# Patient Record
Sex: FEMALE | Race: WHITE | NOT HISPANIC OR LATINO
[De-identification: names, ages, dates, MRNs, and addresses within clinical notes are randomized per-mention and may not be internally consistent; named-entity substitution may affect disease eponyms.]

---

## 2023-03-22 PROBLEM — Z00.00 ENCOUNTER FOR PREVENTIVE HEALTH EXAMINATION: Status: ACTIVE | Noted: 2023-03-22

## 2023-04-13 ENCOUNTER — APPOINTMENT (OUTPATIENT)
Dept: GYNECOLOGIC ONCOLOGY | Facility: CLINIC | Age: 48
End: 2023-04-13
Payer: COMMERCIAL

## 2023-04-13 VITALS
SYSTOLIC BLOOD PRESSURE: 132 MMHG | OXYGEN SATURATION: 96 % | BODY MASS INDEX: 39.27 KG/M2 | HEART RATE: 80 BPM | DIASTOLIC BLOOD PRESSURE: 84 MMHG | HEIGHT: 64 IN | WEIGHT: 230 LBS

## 2023-04-13 DIAGNOSIS — R73.03 PREDIABETES.: ICD-10-CM

## 2023-04-13 DIAGNOSIS — Z80.0 FAMILY HISTORY OF MALIGNANT NEOPLASM OF DIGESTIVE ORGANS: ICD-10-CM

## 2023-04-13 DIAGNOSIS — Z87.42 PERSONAL HISTORY OF OTHER DISEASES OF THE FEMALE GENITAL TRACT: ICD-10-CM

## 2023-04-13 DIAGNOSIS — Z78.9 OTHER SPECIFIED HEALTH STATUS: ICD-10-CM

## 2023-04-13 DIAGNOSIS — Z80.3 FAMILY HISTORY OF MALIGNANT NEOPLASM OF BREAST: ICD-10-CM

## 2023-04-13 PROCEDURE — 99204 OFFICE O/P NEW MOD 45 MIN: CPT

## 2023-04-13 RX ORDER — MEDROXYPROGESTERONE ACETATE 10 MG/1
10 TABLET ORAL
Refills: 0 | Status: ACTIVE | COMMUNITY

## 2023-04-13 RX ORDER — SEMAGLUTIDE 0.68 MG/ML
INJECTION, SOLUTION SUBCUTANEOUS
Refills: 0 | Status: ACTIVE | COMMUNITY

## 2023-04-13 RX ORDER — METFORMIN ER 500 MG 500 MG/1
500 TABLET ORAL
Refills: 0 | Status: ACTIVE | COMMUNITY

## 2023-04-21 NOTE — PHYSICAL EXAM
[Chaperone Present] : A chaperone was present in the examining room during all aspects of the physical examination [Abnormal] : General appearance: Abnormal [Overweight] : overweight [Normal] : Bimanual Exam: Normal [Fully active, able to carry on all pre-disease performance without restriction] : Status 0 - Fully active, able to carry on all pre-disease performance without restriction [FreeTextEntry1] : Patient was interviewed and examined with chaperone present. Name of chaperone: Inna Chowdhury  [de-identified] : deferred [de-identified] : high anterior lying cervix

## 2023-04-21 NOTE — HISTORY OF PRESENT ILLNESS
[FreeTextEntry1] : 48yo , c/s x 1,  x 1, LMP 23 referred by Dr. Arnold for evaluation of endometrial hyperplasia. Patient's menses are irregular with long bouts of amenorrhea managed with Provera therapy to bring on her menses. History of PCOS and prediabetes managed with Ozempic and Metformin. She had EMB in 2022 revealing fragments with focally crowded glands without epithelial atypia, cannot rule out endometrial hyperplasia without atypia.  She was advised to have a D&C which was ultimately done in 2023.  \par \par 23-(John)D&C, hysteroscopy. myosure-endometrial tissue with features of endometrial polyp and mild simple hyperplasia without atypia. ECC with benign endocervical epithelium with atypical squamous metaplasia favor reactive. \par \par Pt was advised to consider a hysterectomy vs repeat sonograms and biopsies. She is done childbearing. \par \par Health maintenance:\par \par Pap [id number] wnl \par Mammo- -rerpots wnl \par Colonoscopy-never  \par \par \par

## 2023-04-21 NOTE — ASSESSMENT
[FreeTextEntry1] : 48yo female with PCOS, irregular menses and simple hyperplasia without atypia. \par \par Discussed with patient treatment options of Provera therapy x 3 months vs IUD vs hysterectomy. I do not feel a hysterectomy is warranted at this time as simple hyperplasias can be managed with medical therapy alone for the most part. I would favor Mirena IUD given her weight issue and increased risk of hyperplasia, however, patient does not like the idea of a foreign body in her uterus. Advised patient that with an IUD, biopsies would also be less frequent. \par \par Pt agreed to proceed with Provera therapy with plan for EMB and pelvic US in 3 months. She will contact me should she change her mind and pursue IUD. \par \par

## 2023-04-21 NOTE — CHIEF COMPLAINT
[FreeTextEntry1] : [facility]\par \par [facility] [phone] at [address] [place] [id number]\par

## 2023-04-21 NOTE — END OF VISIT
[FreeTextEntry3] : This note was written by Inna Rush acting as a scribe for Dr. Shellie Veliz\par This note accurately reflects the work and decisions made by me.\par

## 2023-06-09 ENCOUNTER — TRANSCRIPTION ENCOUNTER (OUTPATIENT)
Age: 48
End: 2023-06-09

## 2023-06-09 ENCOUNTER — APPOINTMENT (OUTPATIENT)
Dept: GYNECOLOGIC ONCOLOGY | Facility: CLINIC | Age: 48
End: 2023-06-09
Payer: COMMERCIAL

## 2023-06-09 PROCEDURE — 99214 OFFICE O/P EST MOD 30 MIN: CPT | Mod: 95

## 2023-07-20 NOTE — END OF VISIT
[Time Spent: ___ minutes] : I have spent [unfilled] minutes of time on the encounter. [FreeTextEntry3] : Written by Kavitha Arizmendi, acting as a scribe for Dr. Shellie Mcghee This note accurately reflects the work and decisions made by me.

## 2023-07-20 NOTE — REASON FOR VISIT
[Home] : at home, [unfilled] , at the time of the visit. [Medical Office: ([facility])___] : at the medical office located in  [FreeTextEntry1] : PCOS, Irregular menses & Simple hyperplasia w/o atypia\par Provera started 4/2023

## 2023-07-20 NOTE — PHYSICAL EXAM
[Normal] : Mood and affect: Normal [FreeTextEntry1] : Kavitha Arizmendi Medical assistant was present during telehealth visit

## 2023-07-20 NOTE — ASSESSMENT
[FreeTextEntry1] : This 48 y/o female with PCOS, irregular menses and simple hyperplasia without atypia presented to my office on 23 for a consultation. I discussed with patient treatment options of Provera therapy x 3 months vs IUD vs Hysterectomy. I did not feel like hysterectomy was warranted at that time as simple hyperplasias can be managed with medical therapy alone for the most part. I advised the patient that I favored Mirena IUD given her weight issue and increased risk of hyperplasia, however, patient did not like the idea of a foreign body in her uterus. I explained to the patient that with IUD biopsies would be less frequent as well. Patient wished to proceed with Provera therapy and plan was for patient to have an EMB and US in 3 months. \par \par Patient reports that she never started Provera given she kept going back and fourth on what she wanted to do. Patient reports feeling like she does not want to start a new medication that she has never taken before, and with IUD she did not feel comfortable with. Patient wants to move forward with surgery at this time. I advised the patient that her decision is not unreasonable, although not usually the route we would take, however patient has risk factors for developing a uterine malignancy. I wanted to make sure patient understood the risk/benefit balance. \par \par I discussed surgery with PELV EUA RA TL BS leaving ovaries in which reduces risk of menopause. I explained that removing patients fallopian tubes decreases risk of developing ovarian cancer. Given patients last US was in September, I would like a more up to date US to take a look at the structure so we could plan accordingly.  \par \par [facility] \par \par Patient reports her previous GYN said she has an unusual amount of scar tissue, She has a hx of a .\par \par Because this was simple hyperplasia I do not view it as an urgent surgery. Patient reports no VB unless she takes medication.  she would like to hold off until the end of the summer.  \par \par I discussed at length with the patient the nature, purpose, risks, benefits, and alternatives of Robot assisted total laparoscopic hysterectomy with bilateral salpingectomy.  The patient understands the risks to include (but not be limited to) bowel injury, bleeding (with the possible need for transfusion), bladder or ureteral injury, infections, deep venous thrombosis, and luna-operative death.  The patient also understands that her surgery may not be able to be performed robotically and that she may need a laparotomy.  She also understands the limitations of robotic surgery and the possibility of missing a surgical complication with need for subsequent re-exploration.  She agrees to proceed.  She asked numerous questions which were answered to her satisfaction.  She understands the need for a pre-operative bowel preparation and agrees to comply with our instructions.  She also understands the rationale for a cystoscopy at the completion of the procedure and the potential risks of cystoscopy.

## 2023-07-25 NOTE — REASON FOR VISIT
[FreeTextEntry1] : [facility] \par \par [facility] [place]. [phone]\par [address] [place] [id number] \par \par PCOS, Irregular menses & Simple hyperplasia w/o atypia\par Scheduled for Pelv EUA LETICIA TLH BS cysto on 9/12/23\par F/u US.

## 2023-07-25 NOTE — HISTORY OF PRESENT ILLNESS
[FreeTextEntry1] : This 46 y/o female with PCOS, irregular menses and simple hyperplasia without atypia presented to my office on 4/13/23 for a consultation. I discussed with patient treatment options of Provera therapy x 3 months vs IUD vs Hysterectomy. I did not feel like hysterectomy was warranted at that time as simple hyperplasias can be managed with medical therapy alone for the most part. I advised the patient that I favored Mirena IUD given her weight issue and increased risk of hyperplasia, however, patient did not like the idea of a foreign body in her uterus. I explained to the patient that with IUD biopsies would be less frequent as well. Patient wished to proceed with Provera therapy and plan was for patient to have an EMB and US in 3 months. \par \par She had a telehealth visit on 6/9/23 and reported never starting Provera given she kept going back and fourth on what she wanted to do. Patient reported feeling like she did not want to start a new medication that she has never taken before, and with IUD she did not feel comfortable with. Patient wanted to move forward with surgery at the time. I advised the patient that her decision is not unreasonable, although not usually the route we would take, however patient has risk factors for developing a uterine malignancy. I wanted to make sure patient understood the risk/benefit balance. \par \par I discussed surgery with PELV EUA RA TLH BS leaving ovaries in which reduces risk of menopause. I explained that removing patients fallopian tubes decreases risk of developing ovarian cancer. Given patients last US was in September, I would like a more up to date US to take a look at the structure so we could plan accordingly. \par \par Because this was simple hyperplasia I do not view it as an urgent surgery. Patient reports no VB unless she takes medication. she would like to hold off until the end of the summer. \par \par She is scheduled for surgery on 9/12/23 and returns to the office today for an US. \par

## 2023-07-25 NOTE — END OF VISIT
[FreeTextEntry3] : Written by Kavitha Arizmendi, acting as a scribe for Dr. Shellie Mcghee This note accurately reflects the work and decisions made by me.

## 2023-07-31 ENCOUNTER — APPOINTMENT (OUTPATIENT)
Dept: GYNECOLOGIC ONCOLOGY | Facility: CLINIC | Age: 48
End: 2023-07-31

## 2023-12-07 ENCOUNTER — LABORATORY RESULT (OUTPATIENT)
Age: 48
End: 2023-12-07

## 2023-12-07 ENCOUNTER — APPOINTMENT (OUTPATIENT)
Dept: GYNECOLOGIC ONCOLOGY | Facility: CLINIC | Age: 48
End: 2023-12-07
Payer: COMMERCIAL

## 2023-12-07 VITALS
HEIGHT: 63 IN | HEART RATE: 71 BPM | OXYGEN SATURATION: 97 % | DIASTOLIC BLOOD PRESSURE: 76 MMHG | WEIGHT: 215 LBS | SYSTOLIC BLOOD PRESSURE: 109 MMHG | RESPIRATION RATE: 16 BRPM | BODY MASS INDEX: 38.09 KG/M2

## 2023-12-07 PROCEDURE — 99214 OFFICE O/P EST MOD 30 MIN: CPT | Mod: 25

## 2023-12-13 LAB — CYTOLOGY CVX/VAG DOC THIN PREP: NORMAL

## 2023-12-21 NOTE — END OF VISIT
[FreeTextEntry3] : Written by Kavitha Arizmendi, acting as a scribe for Dr. Shellie Veliz This note accurately reflects the work and decisions made by me.

## 2023-12-21 NOTE — HISTORY OF PRESENT ILLNESS
[FreeTextEntry1] : This 49 y/o patient with PCOS, irregular menses and simple hyperplasia without atypia presented to my office on 4/13/23 for a consultation. I discussed with patient treatment options of Provera therapy x 3 months vs IUD vs Hysterectomy. I did not feel like hysterectomy was warranted at that time as simple hyperplasias can be managed with medical therapy alone for the most part. I advised the patient that I favored Mirena IUD given her weight issue and increased risk of hyperplasia, however, patient did not like the idea of a foreign body in her uterus. I explained to the patient that with IUD biopsies would be less frequent as well. Patient wished to proceed with Provera therapy and plan was for patient to have an EMB and US in 3 months. Patient was advised to contact me if she changed her mind and wanted to pursue IUD.   She had a telehealth visit on 06/09/2023 to discuss plan of care.   Patient reported at the time of her telehealth that she never started Provera given she kept going back and fourth on what she wanted to do. Patient reported feeling like she did not want to start a new medication that she has never taken before, and with IUD she did not feel comfortable with. Patient wanted to move forward with surgery at the time. I advised the patient that her decision is not unreasonable, although not usually the route we would take, however patient has risk factors for developing a uterine malignancy. I wanted to make sure patient understood the risk/benefit balance.  I discussed surgery with PELV EUA RA TLH BS leaving ovaries in which reduces risk of menopause. I explained that removing patients fallopian tubes decreases risk of developing ovarian cancer. Given patients last US was in September, I wanted a more up to date US to take a look at the structure so we could plan accordingly.  She returns to the office today fora pre op discussion.   EVELIN TV US 10/5/2023: Normal pelvic US   She is scheduled for Surgery on 1/16/24 at [facility]

## 2023-12-21 NOTE — REASON FOR VISIT
[FreeTextEntry1] : [facility] [place]. [phone] [address] [place] [address]   Pre op visit.  Sign surgical consent.

## 2023-12-21 NOTE — ASSESSMENT
[FreeTextEntry1] : I advised patient that I spoke to Dr. Mast this morning, plan is for patient to have a hysterectomy due to hyperplasia seen on original biopsy. I explained that her ovaries will not be removed. There were no concerns on patients most recent US from October which is reassuring.  She reports no new abnormal bleeding. She reports she did get her period mid November, she typically does not get her period. it lasted 4-5 days where it was heavier but for a shorter period of time. She takes Provera 3-4 times a year. She reports no other gynecological symptoms.   Dr. Mast will discuss post op care with patient.   Will do PAP smear today since due, reports no hx of abnormal pap smears.   I discussed at length with the patient the nature, purpose, risks, benefits, and alternatives of total abdominal hysterectomy and bilateral salpingectomy via abdominoplasty incision.  The patient understands the risks to include (but not be limited to) bowel injury, bleeding (with the possible need for transfusion), bladder or ureteral injury, infections, protracted wound closure, deep venous thrombosis, and luna-operative death.  She is also aware of the possibility of an unrecognized surgical complication with need for subsequent re-exploration. She agrees to proceed.  She asked numerous questions which were answered to her satisfaction.  She understands the need for a pre-operative bowel preparation and agrees to comply with our instructions.

## 2024-02-26 ENCOUNTER — NON-APPOINTMENT (OUTPATIENT)
Age: 49
End: 2024-02-26

## 2024-02-26 LAB — HPV HIGH+LOW RISK DNA PNL CVX: DETECTED

## 2024-02-26 RX ORDER — MEDROXYPROGESTERONE ACETATE 10 MG/1
10 TABLET ORAL
Qty: 30 | Refills: 2 | Status: ACTIVE | COMMUNITY
Start: 2023-04-13 | End: 1900-01-01

## 2024-05-16 ENCOUNTER — APPOINTMENT (OUTPATIENT)
Dept: GYNECOLOGIC ONCOLOGY | Facility: CLINIC | Age: 49
End: 2024-05-16
Payer: COMMERCIAL

## 2024-05-16 VITALS
HEIGHT: 63 IN | DIASTOLIC BLOOD PRESSURE: 79 MMHG | SYSTOLIC BLOOD PRESSURE: 120 MMHG | BODY MASS INDEX: 38.98 KG/M2 | WEIGHT: 220 LBS | OXYGEN SATURATION: 100 % | HEART RATE: 72 BPM | RESPIRATION RATE: 16 BRPM

## 2024-05-16 DIAGNOSIS — N85.01 BENIGN ENDOMETRIAL HYPERPLASIA: ICD-10-CM

## 2024-05-16 PROCEDURE — 58100 BIOPSY OF UTERUS LINING: CPT | Mod: 59

## 2024-05-16 PROCEDURE — 99214 OFFICE O/P EST MOD 30 MIN: CPT | Mod: 25

## 2024-05-16 PROCEDURE — 81025 URINE PREGNANCY TEST: CPT

## 2024-05-21 ENCOUNTER — RESULT CHARGE (OUTPATIENT)
Age: 49
End: 2024-05-21

## 2024-05-21 PROBLEM — N85.01 SIMPLE ENDOMETRIAL HYPERPLASIA WITHOUT ATYPIA: Status: ACTIVE | Noted: 2023-04-13

## 2024-05-21 LAB — HCG UR QL: NEGATIVE

## 2024-05-31 NOTE — REASON FOR VISIT
[FreeTextEntry1] : [facility] [place]. [phone] [address] [place] [address]   PCOS, irregular menses and simple hyperplasia without atypia  Surgery denied needs to trial medical management and fail prior to approval of surgery.  EMB

## 2024-05-31 NOTE — PHYSICAL EXAM
[Chaperone Present] : A chaperone was present in the examining room during all aspects of the physical examination [Normal] : Bimanual Exam: Normal [FreeTextEntry1] : Aliya Johnson MA

## 2024-05-31 NOTE — HISTORY OF PRESENT ILLNESS
[FreeTextEntry1] : The 48-year-old patient with PCOS, irregular menses, and simple hyperplasia without atypia was scheduled for PELV EUA MANJEET BS on 4/2/24, but insurance denied surgery. They are now required to undergo a trial of medical management before surgery approval. Today, the patient is at the office for an Endometrial biopsy before starting medication.  She has been on Ozempic on & off within the last 2-3 years, managed by her endocrinologist.  She states this helps with her blood sugar & most recently noticed that it has very likely helped to regulate her menses upon further/independent research. She was previously not having menses with Provera, though has noticed that her cycle is now normalized, cycle lasts about 30 days. Previously required ANIA intervention for both of her children. Her current concern is becoming pregnant.  She underwent abdominoplasty with Dr. Mast/Plastics.

## 2024-05-31 NOTE — ASSESSMENT
[FreeTextEntry1] : 49 y/o patient with hx of simple hyperplasia without atypia. EMB performed successfully today in office.  If EMB today is WNL can return to GYN. As it relates to concern for pregnancy, she should consult with GYN for birth control.   FSH/Estradiol ordered to assess fertility status as pt is concerned about getting pregnant given recently normalized menses. She should have this done between days 1-3 of next menstruation.  I reviewed pap showing +HR HPV, she should f/u GYN in 1 yr for repat pap - if positive will require colpo. I am reassured that genotyping was negative for HPV types 16, 18/45.

## 2024-05-31 NOTE — PROCEDURE
[Endometrial Biopsy] : an endometrial biopsy [Other: ___] : [unfilled] [Patient] : the patient [Written consent] : written consent was obtained prior to the procedure and is detailed in the patient's record [None] : none [1% Lidocaine ___(ml)] :  [unfilled]Uml of 1% Lidocaine [Betadine] : betadine [Silver Nitrate] : silver nitrate [Direct Pressure] : direct pressure [Yes] : the specimen was sent to pathology [No Complications] : none [Tolerated Well] : the patient tolerated the procedure well [Post procedure instructions and information given] : post procedure instructions and information given and reviewed with patient. [FreeTextEntry1] : Pipelle progressed 11 cm x 3 passes  Pelvic rest 48-72 hours

## 2024-05-31 NOTE — END OF VISIT
[FreeTextEntry3] : Written by Aliya Johnson, acting as a scribe for Dr. Shellie Veliz. This note accurately reflects the work and decisions made by me.

## 2024-06-07 ENCOUNTER — APPOINTMENT (OUTPATIENT)
Dept: GYNECOLOGIC ONCOLOGY | Facility: CLINIC | Age: 49
End: 2024-06-07

## 2024-06-07 PROCEDURE — 99212 OFFICE O/P EST SF 10 MIN: CPT

## 2024-06-07 NOTE — ASSESSMENT
[FreeTextEntry1] : The 48-year-old patient with PCOS, irregular menses, and simple hyperplasia without atypia was scheduled for PELV EUA MANJEET BS on 4/2/24, but insurance denied surgery.  They are now required to undergo a trial of medical management before surgery approval.   In regards to the biopsy results, there were no suspicious findings. Although, there was presence of focal progesterone despite the patient not having taken progesterone for a significant period. It's essential to delve deeper into this discovery by consulting with the pathologist to understand why this hormone was detected. Historical use of progesterone, although outdated in the patient's regimen, could explain its persistence in the endometrial lining. This underscores the importance of reassessing past medical history and its potential implications on current diagnostic results.  The patient is currently on Ozempic, it has impacted the menstrual cycles positively. The resumption of regular cycles aligns with the therapeutic effects of Ozempic, suggesting a beneficial impact on hormonal balance.   The patient's upcoming blood work scheduled for the third day of her period presents an opportunity to monitor hormonal levels and assess the overall efficacy of the current treatment plan. This timing is crucial for obtaining accurate insights into hormone dynamics during the menstrual cycle.

## 2024-06-07 NOTE — REASON FOR VISIT
[Home] : at home, [unfilled] , at the time of the visit. [Medical Office: ([facility])___] : at the medical office located in  [Patient] : the patient [Self] : self [FreeTextEntry1] :    Biopsy results

## 2024-06-07 NOTE — HISTORY OF PRESENT ILLNESS
[FreeTextEntry1] : The 48-year-old patient with PCOS, irregular menses, and simple hyperplasia without atypia was scheduled for PELV EUA MANJEET BS on 4/2/24, but insurance denied surgery. They are now required to undergo a trial of medical management before surgery approval. The patient presented 5/16/24 at the office for an Endometrial biopsy before starting medication. Pt feels overall very good after the biopsy  Pt presents today to review results and go over next steps

## 2024-06-07 NOTE — END OF VISIT
[Time Spent: ___ minutes] : I have spent [unfilled] minutes of time on the encounter. [FreeTextEntry3] : Written by Shante Hanson, acting as a scribe for Dr. Shellie Veliz. This note accurately reflects the work and decisions made by me.

## 2024-06-10 LAB — CORE LAB BIOPSY: NORMAL
